# Patient Record
(demographics unavailable — no encounter records)

---

## 2025-01-14 NOTE — HISTORY OF PRESENT ILLNESS
[Denies] : Denies [No] : No [Yes] : Yes [Declined] : Declined [TB] : Tuberculosis screening [Hep acute panel] : Hepatitis acute panel [Left upper extremity] : Left upper extremity [22g] : 22g [Start Time: ___] : Medication Start Time: [unfilled] [End Time: ___] : Medication End Time: [unfilled] [Medication Name: ___] : Medication Name: [unfilled] [Total Amount Administered: ___] : Total Amount Administered: [unfilled] [IV discontinued. Intact. No signs or symptoms of IV complications noted. Time: ___] : IV discontinued. Intact. No signs or symptoms of IV complications noted. Time: [unfilled] [Patient  instructed to seek medical attention with signs and symptoms of adverse effects] : Patient  instructed to seek medical attention with signs and symptoms of adverse effects [Patient left unit in no acute distress] : Patient left unit in no acute distress [Medications administered as ordered and tolerated well.] : Medications administered as ordered and tolerated well. [Blood drawn at time of visit] : Blood drawn at time of visit [de-identified] : AC [de-identified] : [id number] [de-identified] :  Dr. Violeta Sarah was the supervising provider

## 2025-01-16 NOTE — HISTORY OF PRESENT ILLNESS
[FreeTextEntry1] : 1/14/25  Insurance changes next week  -Overall patient is feeling well -Feels significant improvement LBP with Simponi -Does report L3 stiffness started 2-3 months ago, in past R2/3 intermittent swelling stopped last 2 -3 years ago. Aggravated by typing -Also reports chronic neck pain and stiffness bilateral traps notes worse after a poor night sleep but usually uncomfortable throughout the day not improved with activity -24 lb weightloss since 07/2024 was on Mounjaro now d/c. 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 She is presenting in office for her third opinion for dx of a possible auto-immune consult. She was advised to f/u w/ rheum by her PCP because she has been having muscle and bone issues since she was the age of 6. She first presented to Dr. Mullins about 6 months ago and he gave her a non-official dx of sacroiliitis and seronegative spondylarthritis and was given sulfasalazine. She presented for a second opinion to Dr. Nadya Castrejon, who does not agree w/ the dx Dr. Mullins was working her up for.  CHILDHOOD - started developing bunions at the age of 6  - dx w/ stubby toe - dx w/ hemifacial microsomia   SYMPTOMS  [ her CRP is 15 and she does not know her HLAB27 status)   - she gets intermittent swelling in her L2, L3 and R2, R3 PIP in a migratory fashion about every 4-6 months (timing is malleable...it can be every couple of weeks) that can last for days and sometimes weeks - she denies complete immobility in her fingers but severe pain and some difficulty moving these digits  - also has bl wrist pain but denies any other obvious swelling   - since she was 6-7 years old she would have, what she describes as spasming, in her LB/buttock area which was most severe when she was trying to move from a laying position to an upright position. - describes getting locked in position and unable to full get up - now she has constant localized bl buttock pain that persists no matter the time of day (does hurt more towards the end of the day is she spends a lot of the day on her feet/standing) - stretching is the only thing that provide some relief   - c/o explosive diarrhea daily w/ no reports of blood in stool or hx of anemia (to her knowledge) - she has an appointment scheduled to see gastro next week for further eval - she also projectile vomits whenever she consumes alcohol and she has since d/c alcohol consumption - she was bulimic   - she has never tried a course of steroids - was previously on sulfasalazine and was titrating up, but d/c before reaching full dose when Dr. 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   ROS  - recently told she may have mild scoliosis by her PCP during her most recent physical  - she has seen an ophthalmologist who denies seeing any sign of inflammatory eyes  - describes skin hypersensitivity  - hypermobile but denies hx of dislocations and denies being dx w/hypermobility syndrome    SURGICAL HX - rhinoplasty - R wrist cyst removal (surgically)  SOCIAL HX - own a small business as  - currently using vape products and has been using them for 7 years and excessively smokes marijuana   - denies alcohol consumption   FAMILY HX  - HLD (father and sibling) - mental illness (sibling) - breast cancer (mother) - uterine cancer (mother)

## 2025-01-16 NOTE — REVIEW OF SYSTEMS
[Vomiting] : vomiting [Diarrhea] : diarrhea [Joint Pain] : joint pain [Joint Swelling] : joint swelling [As Noted in HPI] : as noted in HPI [Negative] : Heme/Lymph [Recent Weight Loss (___ Lbs)] : recent [unfilled] ~Ulb weight loss [Feeling Poorly] : not feeling poorly [Feeling Tired] : not feeling tired [FreeTextEntry2] : 24 lbs since 07/24 [FreeTextEntry3] : no inflammatory eye symptoms now or in past, screening by opth neg 2022- NEEDS UPDATED SCREENING OPHTHO [FreeTextEntry4] : No nasal or oral ulcers, denies sicca sx [FreeTextEntry7] : Does not report active issue at this time, did not complete colonsocopy< daily w/ no blood in stool present. Vomiting w/o nausea- better lately, scheduled for colonoscopy this m [FreeTextEntry9] : Today reports L3 stiffness and intermittent pain, neck and trap tightness. Previously R wrist- better - s/p bunionectomy L well healed - no complications  [de-identified] : RESOLVED < active rash on bl arms w/ sudden onset > evolving improvement hypopigmentation, now new red raised lesions

## 2025-01-16 NOTE — REVIEW OF SYSTEMS
[Vomiting] : vomiting [Diarrhea] : diarrhea [Joint Pain] : joint pain [Joint Swelling] : joint swelling [As Noted in HPI] : as noted in HPI [Negative] : Heme/Lymph [Recent Weight Loss (___ Lbs)] : recent [unfilled] ~Ulb weight loss [Feeling Poorly] : not feeling poorly [Feeling Tired] : not feeling tired [FreeTextEntry2] : 24 lbs since 07/24 [FreeTextEntry3] : no inflammatory eye symptoms now or in past, screening by opth neg 2022- NEEDS UPDATED SCREENING OPHTHO [FreeTextEntry4] : No nasal or oral ulcers, denies sicca sx [FreeTextEntry7] : Does not report active issue at this time, did not complete colonsocopy< daily w/ no blood in stool present. Vomiting w/o nausea- better lately, scheduled for colonoscopy this m [FreeTextEntry9] : Today reports L3 stiffness and intermittent pain, neck and trap tightness. Previously R wrist- better - s/p bunionectomy L well healed - no complications  [de-identified] : RESOLVED < active rash on bl arms w/ sudden onset > evolving improvement hypopigmentation, now new red raised lesions

## 2025-01-16 NOTE — ASSESSMENT
[FreeTextEntry1] : 24 y/o Polish/French f w/ a hx of depression, anxiety, ligamentous laxity of multiple sites, ADHD and HLD. She is presenting in office for her third opinion for dx of a possible auto-immune consult with strong hx inflammatory back pain and confirmed BL sacroilitis w/ erosions. Presents for routine follow-up. Previously followed by Dr Mullins and Dr Castrejon.. Dx as infant w/ hemifacial microsomia no significant consequences from this... 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 excellent response PO steorids x 2 wks, immediately able to resume all activity to include going to gym for > 40 mins with no problems.  Started Simponi Aria 9/28/23, completed loading dose, continues to feel great- now nearly 1 year on therapy.  Tolerated fine with no complications.   Labs/PE non inflammatory at this point and doing great.   - recurrent and long standing diarrhea, denies blood.. no w/u to date.. but no reported hx anemia. No recent vomiting though hs of coffee ground noted in past nothing recent . Have recommended avoidance of any marijuana..  - + response thus far. Does not report as active issue but did not complete colonoscopy Today reports intermittent L3 stiffness and pain, triggering without locking on exam and flexor tendon fullness, trace bogginess PIP. Ordered XR and US for further evaluation Chronic neck pain and stiffness likely muscular but will get baseline XR neck If evidence inflammation b/l hands would continue Simponi consider add on SSA NOTE: Failed SSA monotherapy 2 tabs BID but well tolerated No personal or FH psoriasis, bowel (needs formal eval) or eye dz (recent opth eval NEG for inflammatory eye disease) but  Describes intermittent swelling in her L2, L3 and R2, R3 PIP in a migratory fashion about every 4-6 months (timing is malleable...it can be every couple of weeks) that can last for days and sometimes weeks  2) Premature OA/ scoliosis.. mild on exam today.. L Power -  Completed surgery w/ HSS pain resolved. Painful w/ drift x many yrs,  pending surgery for correction. Has completed 2 eval w/ podiatry and ortho unsure of best approach. Continues with electric stim at nighttime to promote circulation and healing  3) Psych: working w/ psychiatrist routinely.. and feels like she's doing very well - Eating disorder: Will discuss next visit given significant wt loss. Long hx of depression, at this time 5/10.. on sertraline  100 mg and absolutely feels better when consistent with taking. - ADHD: Now d/c Strattera -patient to call with dose but taken infrequently ..  NOTE: - sibling with MH issues as well  Plan 1/14/25: -XR and US bilateral hands, XR neck  -Provide insurance information as soon as available  -Start magnesium 100-400mg at bedtime  -Start daily self-massage using hook for neck pain and address ergonomics  -Continue routine exercise with   -Continue Simponi infusions Will submit orders to new insurance once insurance information received  -Schedule evaluation with ophthalmologist  -Cancelled colonoscopy - should reschedule for screening w/ hx vomiting and diarrhea   Patient was seen and evaluated by Violeta Sarah DNP (training in rheumatology) and with TRE Khanna I agree with full evaluation and plan as described above.

## 2025-01-16 NOTE — ASSESSMENT
[FreeTextEntry1] : 24 y/o Polish/Sami f w/ a hx of depression, anxiety, ligamentous laxity of multiple sites, ADHD and HLD. She is presenting in office for her third opinion for dx of a possible auto-immune consult with strong hx inflammatory back pain and confirmed BL sacroilitis w/ erosions. Presents for routine follow-up. Previously followed by Dr Mullins and Dr Castrejon.. Dx as infant w/ hemifacial microsomia no significant consequences from this... 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 excellent response PO steorids x 2 wks, immediately able to resume all activity to include going to gym for > 40 mins with no problems.  Started Simponi Aria 9/28/23, completed loading dose, continues to feel great- now nearly 1 year on therapy.  Tolerated fine with no complications.   Labs/PE non inflammatory at this point and doing great.   - recurrent and long standing diarrhea, denies blood.. no w/u to date.. but no reported hx anemia. No recent vomiting though hs of coffee ground noted in past nothing recent . Have recommended avoidance of any marijuana..  - + response thus far. Does not report as active issue but did not complete colonoscopy Today reports intermittent L3 stiffness and pain, triggering without locking on exam and flexor tendon fullness, trace bogginess PIP. Ordered XR and US for further evaluation Chronic neck pain and stiffness likely muscular but will get baseline XR neck If evidence inflammation b/l hands would continue Simponi consider add on SSA NOTE: Failed SSA monotherapy 2 tabs BID but well tolerated No personal or FH psoriasis, bowel (needs formal eval) or eye dz (recent opth eval NEG for inflammatory eye disease) but  Describes intermittent swelling in her L2, L3 and R2, R3 PIP in a migratory fashion about every 4-6 months (timing is malleable...it can be every couple of weeks) that can last for days and sometimes weeks  2) Premature OA/ scoliosis.. mild on exam today.. L Power -  Completed surgery w/ HSS pain resolved. Painful w/ drift x many yrs,  pending surgery for correction. Has completed 2 eval w/ podiatry and ortho unsure of best approach. Continues with electric stim at nighttime to promote circulation and healing  3) Psych: working w/ psychiatrist routinely.. and feels like she's doing very well - Eating disorder: Will discuss next visit given significant wt loss. Long hx of depression, at this time 5/10.. on sertraline  100 mg and absolutely feels better when consistent with taking. - ADHD: Now d/c Strattera -patient to call with dose but taken infrequently ..  NOTE: - sibling with MH issues as well  Plan 1/14/25: -XR and US bilateral hands, XR neck  -Provide insurance information as soon as available  -Start magnesium 100-400mg at bedtime  -Start daily self-massage using hook for neck pain and address ergonomics  -Continue routine exercise with   -Continue Simponi infusions Will submit orders to new insurance once insurance information received  -Schedule evaluation with ophthalmologist  -Cancelled colonoscopy - should reschedule for screening w/ hx vomiting and diarrhea   Patient was seen and evaluated by Violeta Sarah DNP (training in rheumatology) and with TRE Khanna I agree with full evaluation and plan as described above.

## 2025-01-16 NOTE — HISTORY OF PRESENT ILLNESS
[FreeTextEntry1] : 1/14/25  Insurance changes next week  -Overall patient is feeling well -Feels significant improvement LBP with Simponi -Does report L3 stiffness started 2-3 months ago, in past R2/3 intermittent swelling stopped last 2 -3 years ago. Aggravated by typing -Also reports chronic neck pain and stiffness bilateral traps notes worse after a poor night sleep but usually uncomfortable throughout the day not improved with activity -24 lb weightloss since 07/2024 was on Mounjaro now d/c. 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 She is presenting in office for her third opinion for dx of a possible auto-immune consult. She was advised to f/u w/ rheum by her PCP because she has been having muscle and bone issues since she was the age of 6. She first presented to Dr. Mullins about 6 months ago and he gave her a non-official dx of sacroiliitis and seronegative spondylarthritis and was given sulfasalazine. She presented for a second opinion to Dr. Nadya Castrejon, who does not agree w/ the dx Dr. Mullins was working her up for.  CHILDHOOD - started developing bunions at the age of 6  - dx w/ stubby toe - dx w/ hemifacial microsomia   SYMPTOMS  [ her CRP is 15 and she does not know her HLAB27 status)   - she gets intermittent swelling in her L2, L3 and R2, R3 PIP in a migratory fashion about every 4-6 months (timing is malleable...it can be every couple of weeks) that can last for days and sometimes weeks - she denies complete immobility in her fingers but severe pain and some difficulty moving these digits  - also has bl wrist pain but denies any other obvious swelling   - since she was 6-7 years old she would have, what she describes as spasming, in her LB/buttock area which was most severe when she was trying to move from a laying position to an upright position. - describes getting locked in position and unable to full get up - now she has constant localized bl buttock pain that persists no matter the time of day (does hurt more towards the end of the day is she spends a lot of the day on her feet/standing) - stretching is the only thing that provide some relief   - c/o explosive diarrhea daily w/ no reports of blood in stool or hx of anemia (to her knowledge) - she has an appointment scheduled to see gastro next week for further eval - she also projectile vomits whenever she consumes alcohol and she has since d/c alcohol consumption - she was bulimic   - she has never tried a course of steroids - was previously on sulfasalazine and was titrating up, but d/c before reaching full dose when Dr. 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   ROS  - recently told she may have mild scoliosis by her PCP during her most recent physical  - she has seen an ophthalmologist who denies seeing any sign of inflammatory eyes  - describes skin hypersensitivity  - hypermobile but denies hx of dislocations and denies being dx w/hypermobility syndrome    SURGICAL HX - rhinoplasty - R wrist cyst removal (surgically)  SOCIAL HX - own a small business as  - currently using vape products and has been using them for 7 years and excessively smokes marijuana   - denies alcohol consumption   FAMILY HX  - HLD (father and sibling) - mental illness (sibling) - breast cancer (mother) - uterine cancer (mother)

## 2025-01-16 NOTE — PHYSICAL EXAM
[General Appearance - Alert] : alert [General Appearance - In No Acute Distress] : in no acute distress [General Appearance - Well Nourished] : well nourished [General Appearance - Well Developed] : well developed [Sclera] : the sclera and conjunctiva were normal [Extraocular Movements] : extraocular movements were intact [Outer Ear] : the ears and nose were normal in appearance [Respiration, Rhythm And Depth] : normal respiratory rhythm and effort [Auscultation Breath Sounds / Voice Sounds] : lungs were clear to auscultation bilaterally [Heart Rate And Rhythm] : heart rate was normal and rhythm regular [Heart Sounds] : normal S1 and S2 [Heart Sounds Gallop] : no gallops [Murmurs] : no murmurs [Heart Sounds Pericardial Friction Rub] : no pericardial rub [Edema] : there was no peripheral edema [Cervical Lymph Nodes Enlarged Posterior Bilaterally] : posterior cervical [Cervical Lymph Nodes Enlarged Anterior Bilaterally] : anterior cervical [Supraclavicular Lymph Nodes Enlarged Bilaterally] : supraclavicular [No CVA Tenderness] : no ~M costovertebral angle tenderness [No Spinal Tenderness] : no spinal tenderness [Skin Color & Pigmentation] : normal skin color and pigmentation [Skin Turgor] : normal skin turgor [] : no rash [Sensation] : the sensory exam was normal to light touch and pinprick [No Focal Deficits] : no focal deficits [Oriented To Time, Place, And Person] : oriented to person, place, and time [Impaired Insight] : insight and judgment were intact [Affect] : the affect was normal [Abnormal Walk] : normal gait [Musculoskeletal - Swelling] : no joint swelling seen [Motor Tone] : muscle strength and tone were normal [Exaggerated Use Of Accessory Muscles For Inspiration] : no accessory muscle use [General Appearance - Well-Appearing] : healthy appearing [Over the Past 2 Weeks, Have You Felt Down, Depressed, or Hopeless?] : 1.) Over the past 2 weeks, have you felt down, depressed, or hopeless? No [Over the Past 2 Weeks, Have You Felt Little Interest or Pleasure Doing Things?] : 2.) Over the past 2 weeks, have you felt little interest or pleasure doing things? No [FreeTextEntry1] : No synovitis today, L3 triggering without locking and R flexor tendon fullness, PIP trace bogginess, NT... Previously R wrist TTP, w/ laxity bilateral wrist active lateral movement. slight hypermobility in bl digits of hands w/ no swelling; no hyperextension of elbows or knees; large bunion on L 1st MTP w/ some drift...slight bunion on R 1st MTP

## 2025-01-16 NOTE — HISTORY OF PRESENT ILLNESS
[FreeTextEntry1] : 1/14/25  Insurance changes next week  -Overall patient is feeling well -Feels significant improvement LBP with Simponi -Does report L3 stiffness started 2-3 months ago, in past R2/3 intermittent swelling stopped last 2 -3 years ago. Aggravated by typing -Also reports chronic neck pain and stiffness bilateral traps notes worse after a poor night sleep but usually uncomfortable throughout the day not improved with activity -24 lb weightloss since 07/2024 was on Mounjaro now d/c. 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 She is presenting in office for her third opinion for dx of a possible auto-immune consult. She was advised to f/u w/ rheum by her PCP because she has been having muscle and bone issues since she was the age of 6. She first presented to Dr. Mullins about 6 months ago and he gave her a non-official dx of sacroiliitis and seronegative spondylarthritis and was given sulfasalazine. She presented for a second opinion to Dr. Nadya Castrejon, who does not agree w/ the dx Dr. Mullins was working her up for.  CHILDHOOD - started developing bunions at the age of 6  - dx w/ stubby toe - dx w/ hemifacial microsomia   SYMPTOMS  [ her CRP is 15 and she does not know her HLAB27 status)   - she gets intermittent swelling in her L2, L3 and R2, R3 PIP in a migratory fashion about every 4-6 months (timing is malleable...it can be every couple of weeks) that can last for days and sometimes weeks - she denies complete immobility in her fingers but severe pain and some difficulty moving these digits  - also has bl wrist pain but denies any other obvious swelling   - since she was 6-7 years old she would have, what she describes as spasming, in her LB/buttock area which was most severe when she was trying to move from a laying position to an upright position. - describes getting locked in position and unable to full get up - now she has constant localized bl buttock pain that persists no matter the time of day (does hurt more towards the end of the day is she spends a lot of the day on her feet/standing) - stretching is the only thing that provide some relief   - c/o explosive diarrhea daily w/ no reports of blood in stool or hx of anemia (to her knowledge) - she has an appointment scheduled to see gastro next week for further eval - she also projectile vomits whenever she consumes alcohol and she has since d/c alcohol consumption - she was bulimic   - she has never tried a course of steroids - was previously on sulfasalazine and was titrating up, but d/c before reaching full dose when Dr. 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   ROS  - recently told she may have mild scoliosis by her PCP during her most recent physical  - she has seen an ophthalmologist who denies seeing any sign of inflammatory eyes  - describes skin hypersensitivity  - hypermobile but denies hx of dislocations and denies being dx w/hypermobility syndrome    SURGICAL HX - rhinoplasty - R wrist cyst removal (surgically)  SOCIAL HX - own a small business as  - currently using vape products and has been using them for 7 years and excessively smokes marijuana   - denies alcohol consumption   FAMILY HX  - HLD (father and sibling) - mental illness (sibling) - breast cancer (mother) - uterine cancer (mother)

## 2025-01-16 NOTE — ASSESSMENT
[FreeTextEntry1] : 24 y/o Polish/Maltese f w/ a hx of depression, anxiety, ligamentous laxity of multiple sites, ADHD and HLD. She is presenting in office for her third opinion for dx of a possible auto-immune consult with strong hx inflammatory back pain and confirmed BL sacroilitis w/ erosions. Presents for routine follow-up. Previously followed by Dr Mullins and Dr Castrejon.. Dx as infant w/ hemifacial microsomia no significant consequences from this... 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 excellent response PO steorids x 2 wks, immediately able to resume all activity to include going to gym for > 40 mins with no problems.  Started Simponi Aria 9/28/23, completed loading dose, continues to feel great- now nearly 1 year on therapy.  Tolerated fine with no complications.   Labs/PE non inflammatory at this point and doing great.   - recurrent and long standing diarrhea, denies blood.. no w/u to date.. but no reported hx anemia. No recent vomiting though hs of coffee ground noted in past nothing recent . Have recommended avoidance of any marijuana..  - + response thus far. Does not report as active issue but did not complete colonoscopy Today reports intermittent L3 stiffness and pain, triggering without locking on exam and flexor tendon fullness, trace bogginess PIP. Ordered XR and US for further evaluation Chronic neck pain and stiffness likely muscular but will get baseline XR neck If evidence inflammation b/l hands would continue Simponi consider add on SSA NOTE: Failed SSA monotherapy 2 tabs BID but well tolerated No personal or FH psoriasis, bowel (needs formal eval) or eye dz (recent opth eval NEG for inflammatory eye disease) but  Describes intermittent swelling in her L2, L3 and R2, R3 PIP in a migratory fashion about every 4-6 months (timing is malleable...it can be every couple of weeks) that can last for days and sometimes weeks  2) Premature OA/ scoliosis.. mild on exam today.. L Power -  Completed surgery w/ HSS pain resolved. Painful w/ drift x many yrs,  pending surgery for correction. Has completed 2 eval w/ podiatry and ortho unsure of best approach. Continues with electric stim at nighttime to promote circulation and healing  3) Psych: working w/ psychiatrist routinely.. and feels like she's doing very well - Eating disorder: Will discuss next visit given significant wt loss. Long hx of depression, at this time 5/10.. on sertraline  100 mg and absolutely feels better when consistent with taking. - ADHD: Now d/c Strattera -patient to call with dose but taken infrequently ..  NOTE: - sibling with MH issues as well  Plan 1/14/25: -XR and US bilateral hands, XR neck  -Provide insurance information as soon as available  -Start magnesium 100-400mg at bedtime  -Start daily self-massage using hook for neck pain and address ergonomics  -Continue routine exercise with   -Continue Simponi infusions Will submit orders to new insurance once insurance information received  -Schedule evaluation with ophthalmologist  -Cancelled colonoscopy - should reschedule for screening w/ hx vomiting and diarrhea   Patient was seen and evaluated by Violeta Sarah DNP (training in rheumatology) and with TRE Khanna I agree with full evaluation and plan as described above.

## 2025-01-16 NOTE — DATA REVIEWED
[FreeTextEntry1] : baseline eval Dr Mullins:   prior to 2024. HLAB27 neg with nl ESR/ CRP (on steroids) and + GEOVANNA 1:320 with neg subserologies, RF/ CCP, Hep B/C and QFT.   MR 8/23 + mild BL sacroilitis with cortical erosions and subchondral BM edema on R. No active synovitis. NOted L ovarian enlargement

## 2025-01-16 NOTE — REVIEW OF SYSTEMS
[Vomiting] : vomiting [Diarrhea] : diarrhea [Joint Pain] : joint pain [Joint Swelling] : joint swelling [As Noted in HPI] : as noted in HPI [Negative] : Heme/Lymph [Recent Weight Loss (___ Lbs)] : recent [unfilled] ~Ulb weight loss [Feeling Poorly] : not feeling poorly [Feeling Tired] : not feeling tired [FreeTextEntry2] : 24 lbs since 07/24 [FreeTextEntry3] : no inflammatory eye symptoms now or in past, screening by opth neg 2022- NEEDS UPDATED SCREENING OPHTHO [FreeTextEntry4] : No nasal or oral ulcers, denies sicca sx [FreeTextEntry7] : Does not report active issue at this time, did not complete colonsocopy< daily w/ no blood in stool present. Vomiting w/o nausea- better lately, scheduled for colonoscopy this m [FreeTextEntry9] : Today reports L3 stiffness and intermittent pain, neck and trap tightness. Previously R wrist- better - s/p bunionectomy L well healed - no complications  [de-identified] : RESOLVED < active rash on bl arms w/ sudden onset > evolving improvement hypopigmentation, now new red raised lesions

## 2025-06-04 NOTE — HISTORY OF PRESENT ILLNESS
[Denies] : Denies [No] : No [Yes] : Yes [Declined] : Declined [Informed consent documented in EHR.] : Informed consent documented in EHR. [TB] : Tuberculosis screening [Hep acute panel] : Hepatitis acute panel [Left upper extremity] : Left upper extremity [22g] : 22g [Start Time: ___] : Medication Start Time: [unfilled] [End Time: ___] : Medication End Time: [unfilled] [Medication Name: ___] : Medication Name: [unfilled] [Total Amount Administered: ___] : Total Amount Administered: [unfilled] [IV discontinued. Intact. No signs or symptoms of IV complications noted. Time: ___] : IV discontinued. Intact. No signs or symptoms of IV complications noted. Time: [unfilled] [Patient  instructed to seek medical attention with signs and symptoms of adverse effects] : Patient  instructed to seek medical attention with signs and symptoms of adverse effects [Patient left unit in no acute distress] : Patient left unit in no acute distress [Medications administered as ordered and tolerated well.] : Medications administered as ordered and tolerated well. [Blood drawn at time of visit] : Blood drawn at time of visit [de-identified] : [id number]

## 2025-06-04 NOTE — HISTORY OF PRESENT ILLNESS
[Denies] : Denies [Yes] : Yes [No] : No [Declined] : Declined [Informed consent documented in EHR.] : Informed consent documented in EHR. [TB] : Tuberculosis screening [Hep acute panel] : Hepatitis acute panel [Left upper extremity] : Left upper extremity [22g] : 22g [Start Time: ___] : Medication Start Time: [unfilled] [End Time: ___] : Medication End Time: [unfilled] [Medication Name: ___] : Medication Name: [unfilled] [Total Amount Administered: ___] : Total Amount Administered: [unfilled] [IV discontinued. Intact. No signs or symptoms of IV complications noted. Time: ___] : IV discontinued. Intact. No signs or symptoms of IV complications noted. Time: [unfilled] [Patient  instructed to seek medical attention with signs and symptoms of adverse effects] : Patient  instructed to seek medical attention with signs and symptoms of adverse effects [Patient left unit in no acute distress] : Patient left unit in no acute distress [Medications administered as ordered and tolerated well.] : Medications administered as ordered and tolerated well. [Blood drawn at time of visit] : Blood drawn at time of visit [de-identified] : [id number]